# Patient Record
Sex: MALE | Race: WHITE | NOT HISPANIC OR LATINO | Employment: FULL TIME | URBAN - METROPOLITAN AREA
[De-identification: names, ages, dates, MRNs, and addresses within clinical notes are randomized per-mention and may not be internally consistent; named-entity substitution may affect disease eponyms.]

---

## 2024-04-17 ENCOUNTER — ANCILLARY PROCEDURE (OUTPATIENT)
Dept: GENERAL RADIOLOGY | Facility: CLINIC | Age: 29
End: 2024-04-17
Attending: NURSE PRACTITIONER
Payer: COMMERCIAL

## 2024-04-17 ENCOUNTER — OFFICE VISIT (OUTPATIENT)
Dept: URGENT CARE | Facility: URGENT CARE | Age: 29
End: 2024-04-17
Payer: COMMERCIAL

## 2024-04-17 VITALS
OXYGEN SATURATION: 97 % | SYSTOLIC BLOOD PRESSURE: 121 MMHG | HEART RATE: 103 BPM | DIASTOLIC BLOOD PRESSURE: 87 MMHG | TEMPERATURE: 99.9 F | WEIGHT: 239 LBS | RESPIRATION RATE: 15 BRPM

## 2024-04-17 DIAGNOSIS — R53.81 MALAISE: ICD-10-CM

## 2024-04-17 DIAGNOSIS — R07.9 CHEST PAIN, UNSPECIFIED TYPE: ICD-10-CM

## 2024-04-17 DIAGNOSIS — J18.9 PNEUMONIA OF LEFT LOWER LOBE DUE TO INFECTIOUS ORGANISM: Primary | ICD-10-CM

## 2024-04-17 DIAGNOSIS — R05.2 SUBACUTE COUGH: ICD-10-CM

## 2024-04-17 DIAGNOSIS — R50.9 LOW GRADE FEVER: ICD-10-CM

## 2024-04-17 LAB
ERYTHROCYTE [DISTWIDTH] IN BLOOD BY AUTOMATED COUNT: 12.1 % (ref 10–15)
HCT VFR BLD AUTO: 45 % (ref 40–53)
HGB BLD-MCNC: 15.2 G/DL (ref 13.3–17.7)
MCH RBC QN AUTO: 28.1 PG (ref 26.5–33)
MCHC RBC AUTO-ENTMCNC: 33.8 G/DL (ref 31.5–36.5)
MCV RBC AUTO: 83 FL (ref 78–100)
PLATELET # BLD AUTO: 258 10E3/UL (ref 150–450)
RBC # BLD AUTO: 5.41 10E6/UL (ref 4.4–5.9)
WBC # BLD AUTO: 10.6 10E3/UL (ref 4–11)

## 2024-04-17 PROCEDURE — 99204 OFFICE O/P NEW MOD 45 MIN: CPT | Mod: 25 | Performed by: NURSE PRACTITIONER

## 2024-04-17 PROCEDURE — 85027 COMPLETE CBC AUTOMATED: CPT | Performed by: NURSE PRACTITIONER

## 2024-04-17 PROCEDURE — 93000 ELECTROCARDIOGRAM COMPLETE: CPT | Performed by: NURSE PRACTITIONER

## 2024-04-17 PROCEDURE — 36415 COLL VENOUS BLD VENIPUNCTURE: CPT | Performed by: NURSE PRACTITIONER

## 2024-04-17 PROCEDURE — 71046 X-RAY EXAM CHEST 2 VIEWS: CPT | Mod: TC | Performed by: RADIOLOGY

## 2024-04-17 RX ORDER — AZITHROMYCIN 250 MG/1
TABLET, FILM COATED ORAL
Qty: 6 TABLET | Refills: 0 | Status: SHIPPED | OUTPATIENT
Start: 2024-04-17 | End: 2024-04-22

## 2024-04-17 RX ORDER — AMOXICILLIN 500 MG/1
1000 CAPSULE ORAL 3 TIMES DAILY
Qty: 30 CAPSULE | Refills: 0 | Status: SHIPPED | OUTPATIENT
Start: 2024-04-17 | End: 2024-04-22

## 2024-04-17 RX ORDER — LORATADINE 10 MG/1
10 TABLET ORAL DAILY
COMMUNITY

## 2024-04-17 NOTE — PROGRESS NOTES
Assessment & Plan       1. Pneumonia of left lower lobe due to infectious organism  X-ray suspicious for left lower lobe pneumonia per radiology read will treat with oral antibiotic amoxicillin and azithromycin to cover community-acquired pneumonia.  We discussed home care instructions including red flag symptoms that would warrant emergent or urgent evaluation side effects to medications.  - amoxicillin (AMOXIL) 500 MG capsule; Take 2 capsules (1,000 mg) by mouth 3 times daily for 5 days  Dispense: 30 capsule; Refill: 0  - azithromycin (ZITHROMAX) 250 MG tablet; Take 2 tablets (500 mg) by mouth daily for 1 day, THEN 1 tablet (250 mg) daily for 4 days.  Dispense: 6 tablet; Refill: 0    2. Chest pain, unspecified type  Normal EKG  - EKG 12-lead complete w/read - Clinics    3. Subacute cough    - XR Chest 2 Views  - CBC with platelets    4. Low grade fever    - XR Chest 2 Views  - CBC with platelets    5. Malaise    - XR Chest 2 Views  - CBC with platelets    AICHA Heard [facility] HUYEN Chang is a 29 year old male who presents to clinic today for the following health issues:  Chief Complaint   Patient presents with    Urgent Care    Sinus Problem     Sinus congestion since November. Pt thought maybe allergies. Now having chest tightness, heart rate feels up. Body  Feels weak.      HPI    Complains of sinus congestion and PND, now having chest tightness, denies cough feels mucous in chest cough is dry when he coughs. Denies seasonal allergies.   He was treated 3 mos ago for sinus infection with antibiotic symptoms did not change.     Denies use of energy drinks does drink one coke per day.      Review of Systems  Constitutional, HEENT, cardiovascular, pulmonary, gi and gu systems are negative, except as otherwise noted.      Objective    /87   Pulse 103   Temp 99.9  F (37.7  C) (Tympanic)   Resp 15   Wt 108.4 kg (239 lb)   SpO2 97%   Physical Exam    GENERAL: alert and no distress  EYES: Eyes grossly normal to inspection, PERRL and conjunctivae and sclerae normal  HENT: normal cephalic/atraumatic, ear canals and TM's normal, nose and mouth without ulcers or lesions, oropharynx clear, and oral mucous membranes moist  NECK: bilateral anterior cervical adenopathy, no asymmetry, masses, or scars, and thyroid normal to palpation  RESP: decreased breath sounds bibasilar  CV: regular rate and rhythm, normal S1 S2, no S3 or S4, no murmur, click or rub, no peripheral edema  ABDOMEN: soft, nontender, no hepatosplenomegaly, no masses and bowel sounds normal  MS: no gross musculoskeletal defects noted, no edema  SKIN: no suspicious lesions or rashes    Results for orders placed or performed in visit on 04/17/24   XR Chest 2 Views     Status: None    Narrative    EXAM: XR CHEST 2 VIEWS  LOCATION: [facility] [place]  DATE: 4/17/2024    INDICATION:  Subacute cough, Low grade fever, Malaise  COMPARISON: None.      Impression    IMPRESSION: There are patchy left lung opacities suspicious for pneumonia. No pleural effusions. Normal heart size.   CBC with platelets     Status: Normal   Result Value Ref Range    WBC Count 10.6 4.0 - 11.0 10e3/uL    RBC Count 5.41 4.40 - 5.90 10e6/uL    Hemoglobin 15.2 13.3 - 17.7 g/dL    Hematocrit 45.0 40.0 - 53.0 %    MCV 83 78 - 100 fL    MCH 28.1 26.5 - 33.0 pg    MCHC 33.8 31.5 - 36.5 g/dL    RDW 12.1 10.0 - 15.0 %    Platelet Count 258 150 - 450 10e3/uL          EKG Interpretation:      Symptoms at time of EKG: None   Rhythm: Normal sinus   Rate: Normal  Axis: Normal  Ectopy: None  Conduction: Normal  ST Segments/ T Waves: No ST-T wave changes and No acute ischemic changes  Q Waves: None  Comparison to prior: No old EKG available    Clinical Impression: normal EKG

## 2024-04-19 ENCOUNTER — HOSPITAL ENCOUNTER (EMERGENCY)
Facility: CLINIC | Age: 29
Discharge: HOME | End: 2024-04-19
Attending: EMERGENCY MEDICINE | Admitting: EMERGENCY MEDICINE
Payer: COMMERCIAL

## 2024-04-19 VITALS
DIASTOLIC BLOOD PRESSURE: 95 MMHG | HEART RATE: 82 BPM | BODY MASS INDEX: 31.68 KG/M2 | OXYGEN SATURATION: 97 % | HEIGHT: 73 IN | TEMPERATURE: 98 F | SYSTOLIC BLOOD PRESSURE: 139 MMHG | WEIGHT: 239 LBS | RESPIRATION RATE: 18 BRPM

## 2024-04-19 DIAGNOSIS — J18.9 PNEUMONIA DUE TO INFECTIOUS ORGANISM, UNSPECIFIED LATERALITY, UNSPECIFIED PART OF LUNG: ICD-10-CM

## 2024-04-19 LAB
ANION GAP SERPL CALCULATED.3IONS-SCNC: 12 MMOL/L (ref 7–15)
ATRIAL RATE - MUSE: 79 BPM
BASOPHILS # BLD AUTO: 0.1 10E3/UL (ref 0–0.2)
BASOPHILS NFR BLD AUTO: 1 %
BUN SERPL-MCNC: 10.8 MG/DL (ref 6–20)
CALCIUM SERPL-MCNC: 9.7 MG/DL (ref 8.6–10)
CHLORIDE SERPL-SCNC: 103 MMOL/L (ref 98–107)
CREAT SERPL-MCNC: 0.71 MG/DL (ref 0.67–1.17)
D DIMER PPP FEU-MCNC: <0.27 UG/ML FEU (ref 0–0.5)
DEPRECATED HCO3 PLAS-SCNC: 23 MMOL/L (ref 22–29)
DIASTOLIC BLOOD PRESSURE - MUSE: NORMAL MMHG
EGFRCR SERPLBLD CKD-EPI 2021: >90 ML/MIN/1.73M2
EOSINOPHIL # BLD AUTO: 0.3 10E3/UL (ref 0–0.7)
EOSINOPHIL NFR BLD AUTO: 3 %
ERYTHROCYTE [DISTWIDTH] IN BLOOD BY AUTOMATED COUNT: 12.3 % (ref 10–15)
GLUCOSE SERPL-MCNC: 101 MG/DL (ref 70–99)
HCT VFR BLD AUTO: 43.7 % (ref 40–53)
HGB BLD-MCNC: 15.1 G/DL (ref 13.3–17.7)
IMM GRANULOCYTES # BLD: 0.1 10E3/UL
IMM GRANULOCYTES NFR BLD: 1 %
INTERPRETATION ECG - MUSE: NORMAL
LYMPHOCYTES # BLD AUTO: 2.8 10E3/UL (ref 0.8–5.3)
LYMPHOCYTES NFR BLD AUTO: 26 %
MCH RBC QN AUTO: 28.5 PG (ref 26.5–33)
MCHC RBC AUTO-ENTMCNC: 34.6 G/DL (ref 31.5–36.5)
MCV RBC AUTO: 83 FL (ref 78–100)
MONOCYTES # BLD AUTO: 0.8 10E3/UL (ref 0–1.3)
MONOCYTES NFR BLD AUTO: 7 %
NEUTROPHILS # BLD AUTO: 6.6 10E3/UL (ref 1.6–8.3)
NEUTROPHILS NFR BLD AUTO: 62 %
NRBC # BLD AUTO: 0 10E3/UL
NRBC BLD AUTO-RTO: 0 /100
P AXIS - MUSE: 61 DEGREES
PLATELET # BLD AUTO: 274 10E3/UL (ref 150–450)
POTASSIUM SERPL-SCNC: 4 MMOL/L (ref 3.4–5.3)
PR INTERVAL - MUSE: 158 MS
QRS DURATION - MUSE: 86 MS
QT - MUSE: 358 MS
QTC - MUSE: 410 MS
R AXIS - MUSE: 79 DEGREES
RBC # BLD AUTO: 5.29 10E6/UL (ref 4.4–5.9)
SODIUM SERPL-SCNC: 138 MMOL/L (ref 135–145)
SYSTOLIC BLOOD PRESSURE - MUSE: NORMAL MMHG
T AXIS - MUSE: 50 DEGREES
TROPONIN T SERPL HS-MCNC: <6 NG/L
VENTRICULAR RATE- MUSE: 79 BPM
WBC # BLD AUTO: 10.5 10E3/UL (ref 4–11)

## 2024-04-19 PROCEDURE — 99284 EMERGENCY DEPT VISIT MOD MDM: CPT | Mod: 25

## 2024-04-19 PROCEDURE — 85379 FIBRIN DEGRADATION QUANT: CPT | Performed by: EMERGENCY MEDICINE

## 2024-04-19 PROCEDURE — 85025 COMPLETE CBC W/AUTO DIFF WBC: CPT | Performed by: EMERGENCY MEDICINE

## 2024-04-19 PROCEDURE — 36415 COLL VENOUS BLD VENIPUNCTURE: CPT | Performed by: EMERGENCY MEDICINE

## 2024-04-19 PROCEDURE — 84484 ASSAY OF TROPONIN QUANT: CPT | Performed by: EMERGENCY MEDICINE

## 2024-04-19 PROCEDURE — 80048 BASIC METABOLIC PNL TOTAL CA: CPT | Performed by: EMERGENCY MEDICINE

## 2024-04-19 PROCEDURE — 93005 ELECTROCARDIOGRAM TRACING: CPT

## 2024-04-19 ASSESSMENT — COLUMBIA-SUICIDE SEVERITY RATING SCALE - C-SSRS
1. IN THE PAST MONTH, HAVE YOU WISHED YOU WERE DEAD OR WISHED YOU COULD GO TO SLEEP AND NOT WAKE UP?: NO
2. HAVE YOU ACTUALLY HAD ANY THOUGHTS OF KILLING YOURSELF IN THE PAST MONTH?: NO
6. HAVE YOU EVER DONE ANYTHING, STARTED TO DO ANYTHING, OR PREPARED TO DO ANYTHING TO END YOUR LIFE?: YES

## 2024-04-19 ASSESSMENT — ACTIVITIES OF DAILY LIVING (ADL)
ADLS_ACUITY_SCORE: 35

## 2024-04-20 NOTE — ED PROVIDER NOTES
"  History     Chief Complaint:  Shortness of Breath (Pt was dx with LLLpneumonia 2 days ago and started on z-milena and amoxicillin. Pt feels like he is \"fading\" and getting worse. SOB worse since being on abx. )       HPI   Bernardo Chowdhury is a 29 year old male who presents to the emergency department with concerns about pneumonia worsening.  He was diagnosed with pneumonia earlier this week.  He has felt that he is fading away somewhat.  He has been trying to sleep and not been able to do so.  He notes that he had some cough but it has been mild.  Cough has been nonproductive.  No leg swelling.  No hemoptysis.  No recent surgeries or long distance travel.  He notes chest discomfort and feeling of palpitations at times.    He was flagged for suicidal risk.  He notes that he had his issue when he was in high school.  He denies any thoughts of self-harm or harm to others currently.      Independent Historian:   None - Patient Only    Review of External Notes:   Urgent Care 4/17/2024      Medications:    amoxicillin (AMOXIL) 500 MG capsule  azithromycin (ZITHROMAX) 250 MG tablet  loratadine (CLARITIN) 10 MG tablet        Past Medical History:    No past medical history on file.    Past Surgical History:    No past surgical history on file.     Physical Exam   Patient Vitals for the past 24 hrs:   BP Temp Temp src Pulse Resp SpO2 Height Weight   04/19/24 2217 -- -- -- -- -- 96 % -- --   04/19/24 2149 (!) 139/95 -- -- 82 18 97 % -- --   04/19/24 2033 -- 98  F (36.7  C) Oral -- -- -- -- --   04/19/24 2028 (!) 152/94 -- -- 102 18 99 % 1.854 m (6' 1\") 108.4 kg (239 lb)        Physical Exam  Eyes:  The pupils are equal and round    Conjunctivae and sclerae are normal  ENT:    The nose is normal    Pinnae are normal  CV:  Tachycardic and regular    No edema  Resp:  Lungs are clear    Non-labored    No rales    No wheezing   MS:  Normal muscular tone    No asymmetric leg swelling  Skin:  No rash or acute skin lesions noted  Neuro:  "  Awake, alert.      Speech is normal and fluent.    Face is symmetric.     Moves all extremities  Psych: Anxious appearing    Emergency Department Course   ECG  ECG results from 04/19/24   EKG 12-lead, tracing only     Value    Systolic Blood Pressure     Diastolic Blood Pressure     Ventricular Rate 79    Atrial Rate 79    NJ Interval 158    QRS Duration 86        QTc 410    P Axis 61    R AXIS 79    T Axis 50    Interpretation ECG      Sinus rhythm  Normal ECG  No previous ECGs available  Confirmed by GENERATED REPORT, COMPUTER ([id number]),  Martina Pinzon ([id number]) on 4/19/2024 11:08:21 PM          Imaging:  No orders to display          Laboratory:  Labs Ordered and Resulted from Time of ED Arrival to Time of ED Departure   BASIC METABOLIC PANEL - Abnormal       Result Value    Sodium 138      Potassium 4.0      Chloride 103      Carbon Dioxide (CO2) 23      Anion Gap 12      Urea Nitrogen 10.8      Creatinine 0.71      GFR Estimate >90      Calcium 9.7      Glucose 101 (*)    TROPONIN T, HIGH SENSITIVITY - Normal    Troponin T, High Sensitivity <6     D DIMER QUANTITATIVE - Normal    D-Dimer Quantitative <0.27     CBC WITH PLATELETS AND DIFFERENTIAL    WBC Count 10.5      RBC Count 5.29      Hemoglobin 15.1      Hematocrit 43.7      MCV 83      MCH 28.5      MCHC 34.6      RDW 12.3      Platelet Count 274      % Neutrophils 62      % Lymphocytes 26      % Monocytes 7      % Eosinophils 3      % Basophils 1      % Immature Granulocytes 1      NRBCs per 100 WBC 0      Absolute Neutrophils 6.6      Absolute Lymphocytes 2.8      Absolute Monocytes 0.8      Absolute Eosinophils 0.3      Absolute Basophils 0.1      Absolute Immature Granulocytes 0.1      Absolute NRBCs 0.0            Emergency Department Course & Assessments:    Interventions:  Medications - No data to display     Social Determinants of Health affecting care:   None    Disposition:  The patient was discharged.     Impression & Plan       Medical Decision Making:  Bernardo Chowdhury is a 29 year old male who presents to the emergency department with concerns about pneumonia.  Patient has felt abnormal and has had difficulty sleeping.  He says he wakes up with a jolt frequently.  He is concerned that he is having worsening of his pneumonia so he came here to the ER.  Here his oxygenation and pulse rate are normal.  He is afebrile.  Laboratory workup shows a normal white blood cell count and electrolytes.  Symptoms were not classic for pneumonia so D-dimer was obtained and was negative.  Given reassuring vital signs and a negative D-dimer I do not suspect pulmonary embolism.  He is having chest discomfort and troponin was obtained and undetectable.  EKG shows sinus rhythm without acute ischemic changes.  Reviewed x-ray from clinic from 2 days prior.  Pneumonia is quite subtle.  Will have him continue taking his antibiotics.  Follow-up with PCP.  Return with any new or worrisome symptoms.      Diagnosis:    ICD-10-CM    1. Pneumonia due to infectious organism, unspecified laterality, unspecified part of lung  J18.9            Discharge Medications:  Discharge Medication List as of 4/19/2024 11:33 PM             Eric Madrigal MD  4/19/2024   Eric Madrigal MD [place], Aaron Joseph, MD  04/20/24 0055

## 2024-04-20 NOTE — ED NOTES
Discussed pt with Dr. Ryan. D/t stable vital signs no repeat xray ordered at triage. Pt offered xanax to help with possible anxiety. Pt refused xanax as taking medications make him feel anxious.

## 2024-04-20 NOTE — ED TRIAGE NOTES
"Pt dx with LLL pneumonia 2 days ago and on amoxicillin and z-milena. Pt feels like he is \"fading\" and getting worse. Pt c/o worsening SOB and feeling anxious. Chest discomfort.     Triage Assessment (Adult)       Row Name 04/19/24 2029          Triage Assessment    Airway WDL WDL        Respiratory WDL    Respiratory WDL X  feels SOB; coughing up scant amount greenish/yellowish sputum        Skin Circulation/Temperature WDL    Skin Circulation/Temperature WDL WDL        Cardiac WDL    Cardiac WDL X  intermittently tachy        Peripheral/Neurovascular WDL    Peripheral Neurovascular WDL WDL        Cognitive/Neuro/Behavioral WDL    Cognitive/Neuro/Behavioral WDL WDL        [place] Coma Scale    Best Eye Response 4-->(E4) spontaneous     Best Motor Response 6-->(M6) obeys commands     Best Verbal Response 5-->(V5) oriented     Marianne Coma Scale Score 15                     "

## 2024-05-19 ENCOUNTER — HEALTH MAINTENANCE LETTER (OUTPATIENT)
Age: 29
End: 2024-05-19

## 2025-06-08 ENCOUNTER — HEALTH MAINTENANCE LETTER (OUTPATIENT)
Age: 30
End: 2025-06-08